# Patient Record
Sex: FEMALE
[De-identification: names, ages, dates, MRNs, and addresses within clinical notes are randomized per-mention and may not be internally consistent; named-entity substitution may affect disease eponyms.]

---

## 2022-08-03 ENCOUNTER — APPOINTMENT (OUTPATIENT)
Dept: INTERNAL MEDICINE | Facility: CLINIC | Age: 52
End: 2022-08-03

## 2022-08-03 VITALS
SYSTOLIC BLOOD PRESSURE: 110 MMHG | HEIGHT: 62.25 IN | BODY MASS INDEX: 32.34 KG/M2 | HEART RATE: 75 BPM | OXYGEN SATURATION: 98 % | DIASTOLIC BLOOD PRESSURE: 70 MMHG | WEIGHT: 178 LBS

## 2022-08-03 DIAGNOSIS — Z82.49 FAMILY HISTORY OF ISCHEMIC HEART DISEASE AND OTHER DISEASES OF THE CIRCULATORY SYSTEM: ICD-10-CM

## 2022-08-03 DIAGNOSIS — Z83.3 FAMILY HISTORY OF DIABETES MELLITUS: ICD-10-CM

## 2022-08-03 DIAGNOSIS — J06.9 ACUTE UPPER RESPIRATORY INFECTION, UNSPECIFIED: ICD-10-CM

## 2022-08-03 DIAGNOSIS — Z78.9 OTHER SPECIFIED HEALTH STATUS: ICD-10-CM

## 2022-08-03 DIAGNOSIS — Z83.438 FAMILY HISTORY OF OTHER DISORDER OF LIPOPROTEIN METABOLISM AND OTHER LIPIDEMIA: ICD-10-CM

## 2022-08-03 PROCEDURE — 99386 PREV VISIT NEW AGE 40-64: CPT

## 2022-08-03 PROCEDURE — G0444 DEPRESSION SCREEN ANNUAL: CPT | Mod: 59

## 2022-08-04 ENCOUNTER — NON-APPOINTMENT (OUTPATIENT)
Age: 52
End: 2022-08-04

## 2022-08-04 ENCOUNTER — TRANSCRIPTION ENCOUNTER (OUTPATIENT)
Age: 52
End: 2022-08-04

## 2022-08-05 PROBLEM — J06.9 VIRAL URI WITH COUGH: Status: RESOLVED | Noted: 2022-08-05 | Resolved: 2022-09-04

## 2022-08-05 PROBLEM — Z83.438 FAMILY HISTORY OF HYPERLIPIDEMIA: Status: ACTIVE | Noted: 2022-08-05

## 2022-08-05 PROBLEM — Z82.49 FAMILY HISTORY OF HYPERTENSION: Status: ACTIVE | Noted: 2022-08-05

## 2022-08-05 PROBLEM — Z78.9 NEVER SMOKED TOBACCO: Status: ACTIVE | Noted: 2022-08-05

## 2022-08-05 PROBLEM — Z83.3 FHX: DIABETES MELLITUS: Status: ACTIVE | Noted: 2022-08-05

## 2022-08-05 PROBLEM — Z83.3 FAMILY HISTORY OF DIABETES MELLITUS: Status: ACTIVE | Noted: 2022-08-05

## 2022-08-05 PROBLEM — Z82.49 FAMILY HISTORY OF ACUTE MYOCARDIAL INFARCTION: Status: ACTIVE | Noted: 2022-08-05

## 2022-08-05 LAB
25(OH)D3 SERPL-MCNC: 41.8 NG/ML
ALBUMIN SERPL ELPH-MCNC: 4.5 G/DL
ALP BLD-CCNC: 87 U/L
ALT SERPL-CCNC: 25 U/L
ANION GAP SERPL CALC-SCNC: 9 MMOL/L
APPEARANCE: CLEAR
AST SERPL-CCNC: 14 U/L
BACTERIA: NEGATIVE
BASOPHILS # BLD AUTO: 0.04 K/UL
BASOPHILS NFR BLD AUTO: 0.5 %
BILIRUB SERPL-MCNC: 0.3 MG/DL
BILIRUBIN URINE: NEGATIVE
BLOOD URINE: NEGATIVE
BUN SERPL-MCNC: 19 MG/DL
CALCIUM SERPL-MCNC: 10.3 MG/DL
CHLORIDE SERPL-SCNC: 105 MMOL/L
CHOLEST SERPL-MCNC: 227 MG/DL
CO2 SERPL-SCNC: 28 MMOL/L
COLOR: YELLOW
CREAT SERPL-MCNC: 0.88 MG/DL
EGFR: 79 ML/MIN/1.73M2
EOSINOPHIL # BLD AUTO: 0.21 K/UL
EOSINOPHIL NFR BLD AUTO: 2.9 %
ESTIMATED AVERAGE GLUCOSE: 111 MG/DL
GLUCOSE QUALITATIVE U: NEGATIVE
GLUCOSE SERPL-MCNC: 96 MG/DL
HBA1C MFR BLD HPLC: 5.5 %
HCT VFR BLD CALC: 38.8 %
HDLC SERPL-MCNC: 67 MG/DL
HGB BLD-MCNC: 12.4 G/DL
HYALINE CASTS: 2 /LPF
IMM GRANULOCYTES NFR BLD AUTO: 0.3 %
INSULIN SERPL-MCNC: 7.3 UU/ML
KETONES URINE: NEGATIVE
LDLC SERPL CALC-MCNC: 147 MG/DL
LEUKOCYTE ESTERASE URINE: NEGATIVE
LYMPHOCYTES # BLD AUTO: 2.28 K/UL
LYMPHOCYTES NFR BLD AUTO: 31.2 %
MAN DIFF?: NORMAL
MCHC RBC-ENTMCNC: 28.4 PG
MCHC RBC-ENTMCNC: 32 GM/DL
MCV RBC AUTO: 88.8 FL
MICROSCOPIC-UA: NORMAL
MONOCYTES # BLD AUTO: 0.4 K/UL
MONOCYTES NFR BLD AUTO: 5.5 %
NEUTROPHILS # BLD AUTO: 4.35 K/UL
NEUTROPHILS NFR BLD AUTO: 59.6 %
NITRITE URINE: NEGATIVE
NONHDLC SERPL-MCNC: 160 MG/DL
PH URINE: 6.5
PLATELET # BLD AUTO: 274 K/UL
POTASSIUM SERPL-SCNC: 4.3 MMOL/L
PROT SERPL-MCNC: 7.2 G/DL
PROTEIN URINE: NORMAL
RBC # BLD: 4.37 M/UL
RBC # FLD: 13 %
RED BLOOD CELLS URINE: 1 /HPF
SODIUM SERPL-SCNC: 141 MMOL/L
SPECIFIC GRAVITY URINE: 1.02
SQUAMOUS EPITHELIAL CELLS: 6 /HPF
T4 FREE SERPL-MCNC: 1.1 NG/DL
TRIGL SERPL-MCNC: 68 MG/DL
TSH SERPL-ACNC: 2.77 UIU/ML
UROBILINOGEN URINE: NORMAL
VIT B12 SERPL-MCNC: 797 PG/ML
WBC # FLD AUTO: 7.3 K/UL
WHITE BLOOD CELLS URINE: 3 /HPF

## 2022-08-05 RX ORDER — ELECTROLYTES/DEXTROSE
SOLUTION, ORAL ORAL
Refills: 0 | Status: ACTIVE | COMMUNITY
Start: 2022-08-05

## 2022-08-31 ENCOUNTER — APPOINTMENT (OUTPATIENT)
Dept: INTERNAL MEDICINE | Facility: CLINIC | Age: 52
End: 2022-08-31

## 2022-09-15 ENCOUNTER — TRANSCRIPTION ENCOUNTER (OUTPATIENT)
Age: 52
End: 2022-09-15

## 2022-09-15 NOTE — HISTORY OF PRESENT ILLNESS
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 , still sees patients but less and more sedentary\par 3 children-3 c sections, 13, 16, 19 girls\par  is ID specialist with [employer]\par Moved to [place] at age 1, Moved to [place], [place], Father is ortho surgeon,  [facility] [employer] [facility]\par Concerns:\par Weight -dietng -lost 12 lbs in past year , but weight not budging now, wants medications to assist weight loss\par Weight training\par Lives in [place]\par Had 3 COVID vaccines, last 10/13/21\par Knees hurting at times with prolonged standing/walking\par Hx chronic cough after URI , possible postnasal drip, given amityrtiline and reduced cough\par Hx hair loss and saw derm who gave  her meds that helped\par Irregular menses age 47, LMP 2021

## 2022-09-15 NOTE — REVIEW OF SYSTEMS
[Recent Change In Weight] : ~T recent weight change [Hair Changes] : hair changes [Anxiety] : anxiety [Depression] : depression [Negative] : Heme/Lymph [FreeTextEntry8] : irregular meses, hot flashes, LMP Nov 2021 [de-identified] : hair loss

## 2022-09-15 NOTE — HEALTH RISK ASSESSMENT
[Never] : Never [No] : No [No falls in past year] : Patient reported no falls in the past year [0] : 2) Feeling down, depressed, or hopeless: Not at all (0) [PHQ-2 Negative - No further assessment needed] : PHQ-2 Negative - No further assessment needed [Patient reported mammogram was normal] : Patient reported mammogram was normal [Patient reported PAP Smear was normal] : Patient reported PAP Smear was normal [Patient declined colonoscopy] : Patient declined colonoscopy [Never (0 pts)] : Never (0 points) [Audit-CScore] : 0 [[id number]] : 0 [MammogramDate] : 02/22 [PapSmearDate] : 01/22 [ColonoscopyComments] : declined-will do FOBT

## 2022-09-23 ENCOUNTER — APPOINTMENT (OUTPATIENT)
Dept: BARIATRICS/WEIGHT MGMT | Facility: CLINIC | Age: 52
End: 2022-09-23

## 2022-09-23 VITALS — BODY MASS INDEX: 32.09 KG/M2 | HEIGHT: 62 IN | WEIGHT: 174.4 LBS

## 2022-09-23 PROCEDURE — 99205 OFFICE O/P NEW HI 60 MIN: CPT | Mod: 95

## 2022-09-23 NOTE — ASSESSMENT
[FreeTextEntry1] : 52 y.o F with Class 1 obesity, insulin resistance presents for weight management\par \par - open to starting metformin - 1 tab qAM x 1 week, then incr to bid\par - can try Ozempic PA after metformin trial\par - could also consider phentermine, or topiramate\par - discussed shifting ratios of carbs/protein/carbs\par - incr water intake to 64 oz per day\par - can talk to RDN\par - can send email handouts\par \par f/u 4-6 weeks\par

## 2022-09-23 NOTE — HISTORY OF PRESENT ILLNESS
[FreeTextEntry1] : Bariatric surgery history: none\par Obesity co-morbidities:HLD\par Comorbidities improved or resolved:none\par Anti-obesity medications: none\par Obesity medication side effects: none\par \par PATIENT WAS NOTIFIED THAT ANYTHING WE DISCUSSED IN OUR MEETING TODAY MAY BE REFLECTED IN WRITING IN THE VISIT NOTE WHICH WILL BE AVAILABLE TO OTHER MEDICAL PROVIDERS TO REVIEW AS PART OF ROUTINE PATIENT CARE.  PATIENT VERBALLY AGREED. \par \par Ms. GRAEME GRIMM is a 52 year year old female who presents for evaluation and treatment of Class 1 obesity. \par \par Obesity related co- morbidities:  HLD\par + perimenopause\par bad cough reflex\par 174.4, 5'2"\par \par amitryptylline - bid for bad cough reflex, with little codeine.  \par \par Patient lives - 3 kids, . \par Employment status - dentist - 4 days a week\par \par Weight History:\par Lowest adult weight: 120 - before marriage - in 20s.  \par Highest adult weight: 186\par \par Has lost 10 pounds over the past year.\par \par Obesity began in teens.  Weight gain has occurred with: 3 pregnancies. Lots of yoyo dieting.  3rd child at age 38 - post preg 170s, went to the gym, got , stayed in the 170s. \par \par Past weight loss attempts include: macrosInc, liquid diet, RDN.  These have produced a maximum of 10 pounds of weight loss.  MacrosInc for 1.5 years, tracking/weighing of macros. check in with  every week.\par Anti-obesity medications in the past: none.  1221 calories per day - 104g protein, 109g carbs, 41g fat. \par \par Reasons for desiring weight loss: health\par Perceived obstacles to losing weight: unsure. \par \par Sleep: 7 hours.  +snoring. previously was getting less sleep. some menopausal symptoms.  Had BERKLEY screen 7 years - mild BERKLEY, no intervention. \par \par Has 3 regular meals a day. \par \par Diet history:\par wakes up at: 6am\par B: 7am - cottage cheese/greek yogurt, zone protein bar, and cup of coffee\par L: brings lunch at work - spaghetti squash, chicken, marinara sauce and cheese\par snack - 2nd cup of coffee, or protein shakes\par D: eats together - 7- 8pm - sometimes basmati - broiled rice cooked - 1 cup or under salad/veg, with craven lentils or chicken.  \par nighttime - every once in a while, chips - 2 chips, piece of cake\par No pork.\par \par snacks: throughout day - potato chips\par eating after dinner: sometimes\par overeating episodes: rare\par \par Sodas/fast food/processed foods: soda sometimes - 1/week.  \par \par Water intake per day: 4+ cups per day. up and down. usually 20 oz x 2.  \par coffee 1-2 cups per day.  puts milk, and tablespoon creamer. \par soda 1/week - diet pepsi. \par No alcohol. \par \par Physical activity:\par Patient enjoys: biking, walking\par Current physical activity: strength training 3/week, outdoor walk 1/week, peloton 3x/week x 30+ minutes\par \par  Habits patient would like to change: unsure\par Level of interest in losing weight:5/5\par Community support: 5/5\par \par Factors that have helped in the past with losing weight and keeping it off: none\par \par

## 2022-09-23 NOTE — REASON FOR VISIT
[Home] : at home, [unfilled] , at the time of the visit. [Medical Office: ([facility])___] : at the medical office located in  [Initial Evaluation] : an initial evaluation

## 2022-09-23 NOTE — REVIEW OF SYSTEMS
[Patient Intake Form Reviewed] : Patient intake form was reviewed [Negative] : Allergic/Immunologic [MED-ROS-Neuro-FT] : headaches

## 2022-10-18 ENCOUNTER — RX CHANGE (OUTPATIENT)
Age: 52
End: 2022-10-18

## 2022-10-18 RX ORDER — METFORMIN HYDROCHLORIDE 500 MG/1
500 TABLET, COATED ORAL TWICE DAILY
Qty: 60 | Refills: 1 | Status: DISCONTINUED | COMMUNITY
Start: 2022-09-23 | End: 2022-10-18

## 2022-10-28 ENCOUNTER — APPOINTMENT (OUTPATIENT)
Dept: BARIATRICS/WEIGHT MGMT | Facility: CLINIC | Age: 52
End: 2022-10-28

## 2022-10-28 VITALS — WEIGHT: 175.5 LBS | BODY MASS INDEX: 32.1 KG/M2

## 2022-10-28 PROCEDURE — 99214 OFFICE O/P EST MOD 30 MIN: CPT | Mod: 95

## 2022-10-28 NOTE — REASON FOR VISIT
[Initial Evaluation] : an initial evaluation [Home] : at home, [unfilled] , at the time of the visit. [Medical Office: ([facility])___] : at the medical office located in

## 2022-10-28 NOTE — HISTORY OF PRESENT ILLNESS
[FreeTextEntry1] : Bariatric surgery history: none\par Obesity co-morbidities:HLD\par Comorbidities improved or resolved:none\par Anti-obesity medications: metformin\par Obesity medication side effects: none\par \par PATIENT WAS NOTIFIED THAT ANYTHING WE DISCUSSED IN OUR MEETING TODAY MAY BE REFLECTED IN WRITING IN THE VISIT NOTE WHICH WILL BE AVAILABLE TO OTHER MEDICAL PROVIDERS TO REVIEW AS PART OF ROUTINE PATIENT CARE.  PATIENT VERBALLY AGREED. \par \par Ms. GRAEME GRIMM is a 52 year year old female who presents for evaluation and treatment of Class 1 obesity. \par \par Obesity related co- morbidities:  HLD\par + perimenopause\par bad cough reflex\par 174.4, 5'2"\par \par amitryptylline - bid for bad cough reflex, with little codeine.  \par \par Patient lives - 3 kids, . \par Employment status - dentist - 4 days a week\par \par Weight History:\par Lowest adult weight: 120 - before marriage - in 20s.  \par Highest adult weight: 186\par \par Has lost 10 pounds over the past year.\par \par Interim:\par - started metformin, twice a day. \par - did help with appetite.  \par - cough is better.  went to go back to gym. \par - on amitriptylline for cough as needed\par - working with a  online for weight training - 3 days a week\par \par Obesity began in teens.  Weight gain has occurred with: 3 pregnancies. Lots of yoyo dieting.  3rd child at age 38 - post preg 170s, went to the gym, got , stayed in the 170s. \par \par Past weight loss attempts include: macrosInc, liquid diet, RDN.  These have produced a maximum of 10 pounds of weight loss.  MacrosInc for 1.5 years, tracking/weighing of macros. check in with  every week.\par Anti-obesity medications in the past: none.  1221 calories per day - 104g protein, 109g carbs, 41g fat. \par \par Reasons for desiring weight loss: health\par Perceived obstacles to losing weight: unsure. \par \par Sleep: 7 hours.  +snoring. previously was getting less sleep. some menopausal symptoms.  Had BERKLEY screen 7 years - mild BERKLEY, no intervention. \par \par Has 3 regular meals a day. \par \par Diet history:\par wakes up at: 6am\par B: 7am - cottage cheese/greek yogurt, zone protein bar -less these days, and cup of coffee\par L: 2-3pm brings lunch at work - spaghetti squash, chicken, marinara sauce and cheese\par snack - 2nd cup of coffee, or protein shakes\par D: eats together - 7- 8pm - sometimes basmati - broiled rice cooked - 1 cup or under salad/veg, with craven lentils or chicken.  \par nighttime - every once in a while, chips - 2 chips, piece of cake, less these days.\par No pork.\par \par snacks: throughout day - potato chips\par eating after dinner: sometimes\par overeating episodes: rare\par \par Sodas/fast food/processed foods: soda sometimes - 1/week.  \par \par Water intake per day: 4+ cups per day. up and down. usually 20 oz x 2.  \par coffee 1-2 cups per day.  puts milk, and tablespoon creamer. \par soda 1/week - diet pepsi. \par No alcohol. \par \par Physical activity:\par Patient enjoys: biking, walking\par Current physical activity: strength training 3/week, outdoor walk 1/week, peloton 3x/week x 30+ minutes\par \par  Habits patient would like to change: unsure\par Level of interest in losing weight:5/5\par Community support: 5/5\par \par Factors that have helped in the past with losing weight and keeping it off: none\par \par

## 2022-10-28 NOTE — ASSESSMENT
[FreeTextEntry1] : 52 y.o F with Class 1 obesity, insulin resistance presents for weight management\par \par - continue metformin bid.\par - resend Ozempic, metformin failed\par - could also consider topiramate.  In the past was on a stimulant - did not like the stimulant effects\par - discussed shifting ratios of carbs/protein\par - incr water intake to 64 oz per day\par - can talk to RDN\par - can send email handouts\par \par f/u 4-6 weeks\par

## 2022-12-05 ENCOUNTER — TRANSCRIPTION ENCOUNTER (OUTPATIENT)
Age: 52
End: 2022-12-05

## 2022-12-12 ENCOUNTER — TRANSCRIPTION ENCOUNTER (OUTPATIENT)
Age: 52
End: 2022-12-12

## 2022-12-12 ENCOUNTER — APPOINTMENT (OUTPATIENT)
Dept: GASTROENTEROLOGY | Facility: CLINIC | Age: 52
End: 2022-12-12

## 2022-12-12 DIAGNOSIS — Z98.891 HISTORY OF UTERINE SCAR FROM PREVIOUS SURGERY: ICD-10-CM

## 2022-12-12 DIAGNOSIS — Z87.898 PERSONAL HISTORY OF OTHER SPECIFIED CONDITIONS: ICD-10-CM

## 2022-12-12 DIAGNOSIS — K66.0 PERITONEAL ADHESIONS (POSTPROCEDURAL) (POSTINFECTION): ICD-10-CM

## 2022-12-12 DIAGNOSIS — N95.1 MENOPAUSAL AND FEMALE CLIMACTERIC STATES: ICD-10-CM

## 2022-12-12 PROCEDURE — 99443: CPT

## 2022-12-12 RX ORDER — SODIUM SULFATE, MAGNESIUM SULFATE, AND POTASSIUM CHLORIDE 17.75; 2.7; 2.25 G/1; G/1; G/1
1479-225-188 TABLET ORAL
Qty: 1 | Refills: 0 | Status: ACTIVE | COMMUNITY
Start: 2022-12-12 | End: 1900-01-01

## 2022-12-14 ENCOUNTER — APPOINTMENT (OUTPATIENT)
Dept: BARIATRICS/WEIGHT MGMT | Facility: CLINIC | Age: 52
End: 2022-12-14

## 2023-01-02 ENCOUNTER — NON-APPOINTMENT (OUTPATIENT)
Age: 53
End: 2023-01-02

## 2023-01-03 ENCOUNTER — TRANSCRIPTION ENCOUNTER (OUTPATIENT)
Age: 53
End: 2023-01-03

## 2023-01-03 RX ORDER — POLYETHYLENE GLYCOL 3350 17 G/17G
17 POWDER, FOR SOLUTION ORAL
Qty: 1 | Refills: 0 | Status: ACTIVE | COMMUNITY
Start: 2023-01-03 | End: 1900-01-01

## 2023-01-03 RX ORDER — SODIUM SULFATE, MAGNESIUM SULFATE, AND POTASSIUM CHLORIDE 17.75; 2.7; 2.25 G/1; G/1; G/1
1479-225-188 TABLET ORAL
Qty: 1 | Refills: 0 | Status: ACTIVE | COMMUNITY
Start: 2023-01-03 | End: 1900-01-01

## 2023-01-04 ENCOUNTER — TRANSCRIPTION ENCOUNTER (OUTPATIENT)
Age: 53
End: 2023-01-04

## 2023-01-04 LAB — SARS-COV-2 N GENE NPH QL NAA+PROBE: NOT DETECTED

## 2023-01-05 ENCOUNTER — OUTPATIENT (OUTPATIENT)
Dept: OUTPATIENT SERVICES | Facility: HOSPITAL | Age: 53
LOS: 1 days | End: 2023-01-05
Payer: COMMERCIAL

## 2023-01-05 ENCOUNTER — APPOINTMENT (OUTPATIENT)
Dept: GASTROENTEROLOGY | Facility: HOSPITAL | Age: 53
End: 2023-01-05

## 2023-01-05 PROCEDURE — 36415 COLL VENOUS BLD VENIPUNCTURE: CPT

## 2023-01-05 PROCEDURE — 81025 URINE PREGNANCY TEST: CPT

## 2023-01-05 PROCEDURE — 84702 CHORIONIC GONADOTROPIN TEST: CPT

## 2023-01-09 ENCOUNTER — TRANSCRIPTION ENCOUNTER (OUTPATIENT)
Age: 53
End: 2023-01-09

## 2023-01-09 DIAGNOSIS — K21.9 GASTRO-ESOPHAGEAL REFLUX DISEASE WITHOUT ESOPHAGITIS: ICD-10-CM

## 2023-01-09 DIAGNOSIS — Z12.11 ENCOUNTER FOR SCREENING FOR MALIGNANT NEOPLASM OF COLON: ICD-10-CM

## 2023-01-09 DIAGNOSIS — Z00.00 ENCOUNTER FOR GENERAL ADULT MEDICAL EXAMINATION WITHOUT ABNORMAL FINDINGS: ICD-10-CM

## 2023-01-11 ENCOUNTER — TRANSCRIPTION ENCOUNTER (OUTPATIENT)
Age: 53
End: 2023-01-11

## 2023-01-17 ENCOUNTER — APPOINTMENT (OUTPATIENT)
Dept: BARIATRICS/WEIGHT MGMT | Facility: CLINIC | Age: 53
End: 2023-01-17
Payer: COMMERCIAL

## 2023-01-17 VITALS — WEIGHT: 169 LBS | BODY MASS INDEX: 30.91 KG/M2

## 2023-01-17 PROCEDURE — 99214 OFFICE O/P EST MOD 30 MIN: CPT | Mod: 95

## 2023-01-17 NOTE — REASON FOR VISIT
[Home] : at home, [unfilled] , at the time of the visit. [Medical Office: ([facility])___] : at the medical office located in  [Follow-Up] : a follow-up visit

## 2023-01-17 NOTE — ASSESSMENT
[FreeTextEntry1] : 52 y.o F with Class 1 obesity, insulin resistance presents for weight management\par \par - continue metformin bid.\par - continue Ozempic 0.5mg - just got over nausea and wants to stay at this  dose for now\par - discussed having lighter dinner, adding breakfast, and heavier lunch\par - we can talk about Ramadan fasting suggestions to keep in mind in March\par - could also consider topiramate.  In the past was on a stimulant - did not like the stimulant effects\par - discussed shifting ratios of carbs/protein\par - incr water intake to 64 oz per day\par - can talk to RDN\par - can send email handouts\par \par f/u 4-6 weeks\par  none...

## 2023-01-17 NOTE — HISTORY OF PRESENT ILLNESS
[FreeTextEntry1] : Bariatric surgery history: none\par Obesity co-morbidities:HLD\par Comorbidities improved or resolved:none\par Anti-obesity medications: metformin, Ozempic\par Obesity medication side effects: none\par \par PATIENT WAS NOTIFIED THAT ANYTHING WE DISCUSSED IN OUR MEETING TODAY MAY BE REFLECTED IN WRITING IN THE VISIT NOTE WHICH WILL BE AVAILABLE TO OTHER MEDICAL PROVIDERS TO REVIEW AS PART OF ROUTINE PATIENT CARE.  PATIENT VERBALLY AGREED. \par \par Ms. GRAEME GRIMM is a 52 year year old female who presents for evaluation and treatment of Class 1 obesity. \par \par Obesity related co- morbidities:  HLD\par + perimenopause\par bad cough reflex\par 174.4, 5'2"\par \par amitryptylline - bid for bad cough reflex, with little codeine.  \par \par Patient lives - 3 kids, . \par Employment status - dentist - 4 days a week\par \par Weight History:\par Lowest adult weight: 120 - before marriage - in 20s.  \par Highest adult weight: 186\par \par Has lost 10 pounds over the past year.\par \par Interim:\par - taking Ozempic for 2 months - now 0.5mg.  Has noticed decr appetite, nausea is managed\par - she went through colonoscopy prep but didn't get colonoscopy bc of a mixup w false pos preg test, she lost 5 lbs and has regained about 3#\par - taking metformin at night.  \par - did help with appetite.  \par - working with a  online for weight training - 3 days a week\par - doing IF 12-8pm, with friends.  having 2 meals a day, dinner heaviest with family\par - continues with the Peloton\par \par Obesity began in teens.  Weight gain has occurred with: 3 pregnancies. Lots of yoyo dieting.  3rd child at age 38 - post preg 170s, went to the gym, got , stayed in the 170s. \par \par Past weight loss attempts include: macrosInc, liquid diet, RDN.  These have produced a maximum of 10 pounds of weight loss.  MacrosInc for 1.5 years, tracking/weighing of macros. check in with  every week.\par Anti-obesity medications in the past: none.  1221 calories per day - 104g protein, 109g carbs, 41g fat. \par \par Reasons for desiring weight loss: health\par Perceived obstacles to losing weight: unsure. \par \par Sleep: 7 hours.  +snoring. previously was getting less sleep. some menopausal symptoms.  Had BERKLEY screen 7 years - mild BERKLEY, no intervention. \par \par Has 3 regular meals a day. \par \par Diet history:\par wakes up at: 6am\par B: 7am - cottage cheese/greek yogurt, zone protein bar -less these days, and cup of coffee\par L: 2-3pm brings lunch at work - spaghetti squash, chicken, marinara sauce and cheese.  These days 12-1pm\par snack - 2nd cup of coffee, or protein shakes\par D: eats together - 7- 8pm - sometimes basmati - broiled rice cooked - 1 cup or under salad/veg, with craven lentils or chicken.  \par nighttime - every once in a while, chips - 2 chips, piece of cake, less these days.\par No pork.\par \par snacks: throughout day - potato chips\par eating after dinner: sometimes\par overeating episodes: rare\par \par Sodas/fast food/processed foods: soda sometimes - 1/week.  \par \par Water intake per day: 64 oz per day\par coffee 1-2 cups per day.  puts milk, and tablespoon creamer. \par soda 1/week - diet pepsi. \par No alcohol. \par \par Physical activity:\par Patient enjoys: biking, walking\par Current physical activity: strength training 3/week, outdoor walk 1/week, peloton 3x/week x 30+ minutes\par \par  Habits patient would like to change: unsure\par Level of interest in losing weight:5/5\par Community support: 5/5\par \par Factors that have helped in the past with losing weight and keeping it off: none\par \par

## 2023-02-28 ENCOUNTER — APPOINTMENT (OUTPATIENT)
Dept: BARIATRICS/WEIGHT MGMT | Facility: CLINIC | Age: 53
End: 2023-02-28
Payer: COMMERCIAL

## 2023-02-28 VITALS — BODY MASS INDEX: 30.89 KG/M2 | WEIGHT: 168.9 LBS

## 2023-02-28 DIAGNOSIS — Z12.11 ENCOUNTER FOR SCREENING FOR MALIGNANT NEOPLASM OF COLON: ICD-10-CM

## 2023-02-28 PROCEDURE — 99214 OFFICE O/P EST MOD 30 MIN: CPT | Mod: 95

## 2023-02-28 NOTE — REASON FOR VISIT
[Follow-Up] : a follow-up visit [Home] : at home, [unfilled] , at the time of the visit. [Medical Office: ([facility])___] : at the medical office located in  English

## 2023-02-28 NOTE — ASSESSMENT
[FreeTextEntry1] : 52 y.o F with Class 1 obesity, insulin resistance presents for weight management\par \par - continue metformin bid.\par - we discussed Ramadan changes - decr to 0.25mg during those weeks, d/c if having nausea.  \par - continue Ozempic 0.5mg.  Next time can consider increasing to Ozempic 1mg and self-titrate to 0.75\par - discussed having lighter dinner, adding breakfast, and heavier lunch\par - could also consider topiramate.  In the past was on a stimulant - did not like the stimulant effects\par - discussed shifting ratios of carbs/protein\par - incr water intake to 64 oz per day\par - can talk to RDN - given #  - open to more plant based meals\par \par f/u 4-6 weeks\par

## 2023-02-28 NOTE — HISTORY OF PRESENT ILLNESS
[FreeTextEntry1] : Bariatric surgery history: none\par Obesity co- morbidities:HLD\par Comorbidities improved or resolved:none\par Anti-obesity medications: metformin, Ozempic\par Obesity medication side effects: none\par \par PATIENT WAS NOTIFIED THAT ANYTHING WE DISCUSSED IN OUR MEETING TODAY MAY BE REFLECTED IN WRITING IN THE VISIT NOTE WHICH WILL BE AVAILABLE TO OTHER MEDICAL PROVIDERS TO REVIEW AS PART OF ROUTINE PATIENT CARE.  PATIENT VERBALLY AGREED. \par \par Ms. GRAEME GRIMM is a 52 year year old female who presents for evaluation and treatment of Class 1 obesity. \par \par Obesity related co- morbidities:  HLD\par + perimenopause\par bad cough reflex\par 174.4, 5'2"\par \par amitryptylline - bid for bad cough reflex, with little codeine.  \par \par Patient lives - 3 kids, . \par Employment status - dentist - 4 days a week\par \par Weight History:\par Lowest adult weight: 120 - before marriage - in 20s.  \par Highest adult weight: 186\par \par Has lost 10 pounds over the past year.\par \par Interim:\par - taking Ozempic for 2 months - now 0.5mg.  Has noticed decr appetite, nausea is managed - not as obvious as what it was when she started taking it though.  She's open to increasing but better to wait until after Ramadan is done\par - taking nighttime metformin, skipping in the mornings.\par - working with a  online for weight training - 3 days a week, but this month visited family and has been pretty hectic, weight is about the same\par - doing IF 12-8pm, with friends.  having 2 meals a day, dinner heaviest with family\par - continues with the Peloton, restart this week when back home\par \par Obesity began in teens.  Weight gain has occurred with: 3 pregnancies. Lots of yoyo dieting.  3rd child at age 38 - post preg 170s, went to the gym, got , stayed in the 170s. \par \par Past weight loss attempts include: macrosInc, liquid diet, RDN.  These have produced a maximum of 10 pounds of weight loss.  MacrosInc for 1.5 years, tracking/weighing of macros. check in with  every week.\par Anti-obesity medications in the past: none.  1221 calories per day - 104g protein, 109g carbs, 41g fat. \par \par Reasons for desiring weight loss: health\par Perceived obstacles to losing weight: unsure. \par \par Sleep: 7 hours.  +snoring. previously was getting less sleep. some menopausal symptoms.  Had BERKLEY screen 7 years - mild BERKLEY, no intervention. \par \par Has 3 regular meals a day. \par \par Diet history:\par wakes up at: 6am\par B: 7am - cottage cheese/greek yogurt, zone protein bar -less these days, and cup of coffee\par L: 2-3pm brings lunch at work - spaghetti squash, chicken, marinara sauce and cheese.  These days 12-1pm\par snack - 2nd cup of coffee, or protein shakes\par D: eats together - 7- 8pm - sometimes basmati - broiled rice cooked - 1 cup or under salad/veg, with craven lentils or chicken.  \par nighttime - every once in a while, chips - 2 chips, piece of cake, less these days.\par No pork.\par \par snacks: throughout day - potato chips\par eating after dinner: sometimes\par overeating episodes: rare\par \par Sodas/fast food/processed foods: soda sometimes - 1/week.  \par \par Water intake per day: 64 oz per day\par coffee 1-2 cups per day.  puts milk, and tablespoon creamer. \par soda 1/week - diet pepsi. \par No alcohol. \par \par Physical activity:\par Patient enjoys: biking, walking\par Current physical activity: strength training 3/week, outdoor walk 1/week, peloton 3x/week x 30+ minutes\par \par  Habits patient would like to change: unsure\par Level of interest in losing weight:5/5\par Community support: 5/5\par \par Factors that have helped in the past with losing weight and keeping it off: none\par \par

## 2023-03-08 ENCOUNTER — LABORATORY RESULT (OUTPATIENT)
Age: 53
End: 2023-03-08

## 2023-03-10 ENCOUNTER — APPOINTMENT (OUTPATIENT)
Dept: GASTROENTEROLOGY | Facility: AMBULATORY MEDICAL SERVICES | Age: 53
End: 2023-03-10
Payer: COMMERCIAL

## 2023-03-10 ENCOUNTER — RESULT REVIEW (OUTPATIENT)
Age: 53
End: 2023-03-10

## 2023-03-10 PROCEDURE — 43239 EGD BIOPSY SINGLE/MULTIPLE: CPT | Mod: 59

## 2023-03-10 PROCEDURE — 45378 DIAGNOSTIC COLONOSCOPY: CPT

## 2023-04-04 ENCOUNTER — APPOINTMENT (OUTPATIENT)
Dept: BARIATRICS/WEIGHT MGMT | Facility: CLINIC | Age: 53
End: 2023-04-04
Payer: COMMERCIAL

## 2023-04-04 VITALS — WEIGHT: 168 LBS | BODY MASS INDEX: 30.73 KG/M2

## 2023-04-04 DIAGNOSIS — K21.9 GASTRO-ESOPHAGEAL REFLUX DISEASE W/OUT ESOPHAGITIS: ICD-10-CM

## 2023-04-04 PROCEDURE — 99214 OFFICE O/P EST MOD 30 MIN: CPT | Mod: 95

## 2023-04-04 NOTE — REASON FOR VISIT
[Follow-Up] : a follow-up visit [Home] : at home, [unfilled] , at the time of the visit. [Medical Office: ([facility])___] : at the medical office located in

## 2023-04-04 NOTE — HISTORY OF PRESENT ILLNESS
[FreeTextEntry1] : Bariatric surgery history: none\par Obesity co- morbidities:HLD\par Comorbidities improved or resolved:none\par Anti-obesity medications: metformin, Ozempic\par Obesity medication side effects: none\par \par PATIENT WAS NOTIFIED THAT ANYTHING WE DISCUSSED IN OUR MEETING TODAY MAY BE REFLECTED IN WRITING IN THE VISIT NOTE WHICH WILL BE AVAILABLE TO OTHER MEDICAL PROVIDERS TO REVIEW AS PART OF ROUTINE PATIENT CARE.  PATIENT VERBALLY AGREED. \par \par Ms. GRAEME GRIMM is a 52 year year old female who presents for evaluation and treatment of Class 1 obesity. \par \par Obesity related co- morbidities:  HLD\par + perimenopause\par bad cough reflex\par 174.4, 5'2"\par \par amitryptylline - bid for bad cough reflex, with little codeine.  \par \par Patient lives - 3 kids, . \par Employment status - dentist - 4 days a week\par \par Weight History:\par Lowest adult weight: 120 - before marriage - in 20s.  \par Highest adult weight: 186\par \par Has lost 10 pounds over the past year.\par \par Interim:\par - taking Ozempic for 2 months - now 0.5mg.  Has noticed decr appetite, nausea is managed - not as obvious as what it was when she started taking it though.  \par - taking metformin once a day, and Ozempic 0.25mg \par - She's open to increasing but better to wait until after Ramadan is done - Apr 20th\par - yogurt in the morning, and fruit, 5AM, and evenings 7pm - 1-2 times fried stuff, chaat, samosa, and wait 1 hour for meal\par - taking nighttime metformin, skipping in the mornings\par - working with a  online for weight training - 3 days a week, but this month visited family and has been pretty hectic, weight is about the same\par - doing IF 12-8pm, with friends.  having 2 meals a day, dinner heaviest with family\par - continues with the Peloton, restart this week when back home\par \par Obesity began in teens.  Weight gain has occurred with: 3 pregnancies. Lots of yoyo dieting.  3rd child at age 38 - post preg 170s, went to the gym, got , stayed in the 170s. \par \par Past weight loss attempts include: macrosInc, liquid diet, RDN.  These have produced a maximum of 10 pounds of weight loss.  MacrosInc for 1.5 years, tracking/weighing of macros. check in with  every week.\par Anti-obesity medications in the past: none.  1221 calories per day - 104g protein, 109g carbs, 41g fat. \par \par Reasons for desiring weight loss: health\par Perceived obstacles to losing weight: unsure. \par \par Sleep: 7 hours.  +snoring. previously was getting less sleep. some menopausal symptoms.  Had BERKLEY screen 7 years - mild BERKLEY, no intervention. \par \par Has 3 regular meals a day. \par \par Diet history:\par wakes up at: 6am\par B: 7am - cottage cheese/greek yogurt, zone protein bar -less these days, and cup of coffee\par L: 2-3pm brings lunch at work - spaghetti squash, chicken, marinara sauce and cheese.  These days 12-1pm\par snack - 2nd cup of coffee, or protein shakes\par D: eats together - 7- 8pm - sometimes basmati - broiled rice cooked - 1 cup or under salad/veg, with craven lentils or chicken.  \par nighttime - every once in a while, chips - 2 chips, piece of cake, less these days.\par No pork.\par \par snacks: throughout day - potato chips\par eating after dinner: sometimes\par overeating episodes: rare\par \par Sodas/fast food/processed foods: soda sometimes - 1/week.  \par \par Water intake per day: 64 oz per day\par coffee 1-2 cups per day.  puts milk, and tablespoon creamer. \par soda 1/week - diet pepsi. \par No alcohol. \par \par Physical activity:\par Patient enjoys: biking, walking\par Current physical activity: strength training 3/week, outdoor walk 1/week, peloton 3x/week x 30+ minutes\par \par  Habits patient would like to change: unsure\par Level of interest in losing weight:5/5\par Community support: 5/5\par \par Factors that have helped in the past with losing weight and keeping it off: none\par \par

## 2023-04-04 NOTE — ASSESSMENT
[FreeTextEntry1] : 52 y.o F with Class 1 obesity, insulin resistance presents for weight management\par \par - continue metformin once a day. \par - we discussed Ramadan changes - decr to 0.25mg during those weeks, d/c if having nausea - doing well\par - continue Ozempic 0.25mg for 2 weeks until end of Ramadan.  Next time can consider increasing to Ozempic 0.5mg after Ramadan ends and go up on metformin again to bid\par - could also consider topiramate.  In the past was on a stimulant - did not like the stimulant effects\par - discussed shifting ratios of carbs/protein\par - incr water intake to 64 oz per day, as tolerated\par - can talk to RDN - given #  - open to more plant based meals\par \par f/u 4-6 weeks\par

## 2023-04-20 ENCOUNTER — TRANSCRIPTION ENCOUNTER (OUTPATIENT)
Age: 53
End: 2023-04-20

## 2023-05-10 ENCOUNTER — TRANSCRIPTION ENCOUNTER (OUTPATIENT)
Age: 53
End: 2023-05-10

## 2023-05-12 ENCOUNTER — APPOINTMENT (OUTPATIENT)
Dept: BARIATRICS/WEIGHT MGMT | Facility: CLINIC | Age: 53
End: 2023-05-12
Payer: COMMERCIAL

## 2023-05-12 VITALS — BODY MASS INDEX: 30.54 KG/M2 | WEIGHT: 167 LBS

## 2023-05-12 PROCEDURE — 99214 OFFICE O/P EST MOD 30 MIN: CPT | Mod: 95

## 2023-05-12 RX ORDER — SEMAGLUTIDE 0.68 MG/ML
2 INJECTION, SOLUTION SUBCUTANEOUS
Qty: 1 | Refills: 2 | Status: DISCONTINUED | COMMUNITY
Start: 2022-10-28 | End: 2023-05-12

## 2023-05-15 NOTE — HISTORY OF PRESENT ILLNESS
[FreeTextEntry1] : Bariatric surgery history: none\par Obesity co- morbidities:HLD\par Comorbidities improved or resolved:none\par Anti-obesity medications: metformin, Wegovy\par Obesity medication side effects: none\par \par PATIENT WAS NOTIFIED THAT ANYTHING WE DISCUSSED IN OUR MEETING TODAY MAY BE REFLECTED IN WRITING IN THE VISIT NOTE WHICH WILL BE AVAILABLE TO OTHER MEDICAL PROVIDERS TO REVIEW AS PART OF ROUTINE PATIENT CARE.  PATIENT VERBALLY AGREED. \par \par Ms. GRAEME GRIMM is a 52 year year old female who presents for evaluation and treatment of Class 1 obesity. \par \par Obesity related co- morbidities:  HLD\par + perimenopause\par bad cough reflex\par 174.4, 5'2"\par \par amitryptylline - bid for bad cough reflex, with little codeine.  \par \par Patient lives - 3 kids, . \par Employment status - dentist - 4 days a week\par \par Weight History:\par Lowest adult weight: 120 - before marriage - in 20s.  \par Highest adult weight: 186\par \par Has lost 10 pounds over the past year.\par \par Interim:\par - taking Ozempic for 2 months - now 0.5mg.  Has noticed decr appetite, nausea is managed - not as obvious as what it was when she started taking it though.  \par - taking metformin once a day, and Wegovy 0.5mg.   \par - yogurt in the morning, and fruit, 5AM, and evenings 7pm - 1-2 times fried stuff, chaat, samosa, and wait 1 hour for meal\par - taking nighttime metformin, skipping in the mornings\par - working with a  online for weight training - 3 days a week, but this month visited family and has been pretty hectic, weight is about the same\par - doing IF 12-8pm, with friends.  having 2 meals a day, dinner heaviest with family\par - continues with the [place], restart this week when back home\par \par Obesity began in teens.  Weight gain has occurred with: 3 pregnancies. Lots of yoyo dieting.  3rd child at age 38 - post preg 170s, went to the gym, got , stayed in the 170s. \par \par Past weight loss attempts include: macrosInc, liquid diet, RDN.  These have produced a maximum of 10 pounds of weight loss.  MacrosInc for 1.5 years, tracking/weighing of macros. check in with  every week.\par Anti-obesity medications in the past: none.  1221 calories per day - 104g protein, 109g carbs, 41g fat. \par \par Reasons for desiring weight loss: health\par Perceived obstacles to losing weight: unsure. \par \par Sleep: 7 hours.  +snoring. previously was getting less sleep. some menopausal symptoms.  Had BERKLEY screen 7 years - mild BERKLEY, no intervention. \par \par Has 3 regular meals a day. \par \par Diet history:\par wakes up at: 6am\par B: 7am - cottage cheese/greek yogurt, zone protein bar -less these days, and cup of coffee. sometimes skips\par L: 2-3pm brings lunch at work - spaghetti squash, chicken, marinara sauce and cheese.  These days 12-1pm\par snack - 2nd cup of coffee, or protein shakes\par D: eats together - 7- 8pm - sometimes basmati - broiled rice cooked - 1 cup or under salad/veg, with craven lentils or chicken.  \par nighttime - every once in a while, chips - 2 chips, piece of cake, less these days.\par No pork.\par \par snacks: throughout day - potato chips\par eating after dinner: sometimes\par overeating episodes: rare\par \par Sodas/fast food/processed foods: soda sometimes - 1/week.  \par \par Water intake per day: 64 oz per day\par coffee 1-2 cups per day.  puts milk, and tablespoon creamer. \par soda 1/week - diet pepsi. \par No alcohol. \par \par Physical activity:\par Patient enjoys: biking, walking\par Current physical activity: strength training 3/week, outdoor walk 1/week, peloton 3x/week x 30+ minutes\par \par

## 2023-05-15 NOTE — ASSESSMENT
[FreeTextEntry1] : 52 y.o F with Class 1 obesity, insulin resistance presents for weight management\par \par - continue metformin once a day. metformin again to bid\par - switching to wegovy.  \par - could also consider topiramate.  In the past was on a stimulant - did not like the stimulant effects\par - discussed shifting ratios of carbs/protein\par - incr water intake to 64 oz per day, as tolerated\par - can talk to RDN - given #  - open to more plant based meals\par \par f/u 4-6 weeks\par

## 2023-05-15 NOTE — REASON FOR VISIT
[Follow-Up] : a follow-up visit [Home] : at home, [unfilled] , at the time of the visit. [Medical Office: ([facility])___] : at the medical office located in  [Patient] : the patient

## 2023-05-30 ENCOUNTER — TRANSCRIPTION ENCOUNTER (OUTPATIENT)
Age: 53
End: 2023-05-30

## 2023-06-23 ENCOUNTER — APPOINTMENT (OUTPATIENT)
Dept: BARIATRICS/WEIGHT MGMT | Facility: CLINIC | Age: 53
End: 2023-06-23
Payer: COMMERCIAL

## 2023-06-23 VITALS — BODY MASS INDEX: 31.46 KG/M2 | WEIGHT: 172 LBS

## 2023-06-23 PROCEDURE — 99214 OFFICE O/P EST MOD 30 MIN: CPT | Mod: 95

## 2023-06-23 NOTE — ASSESSMENT
[FreeTextEntry1] : 53 y.o F with Class 1 obesity, insulin resistance presents for weight management\par \par - continue metformin once a day. metformin again to bid\par - wegovy 0.5mg dosage not available.  will try 1mg, to see if its available\par - increase PA as tolerated\par - could also consider topiramate.  In the past was on a stimulant - did not like the stimulant effects\par - discussed shifting ratios of carbs/protein\par - incr water intake to 64 oz per day, as tolerated\par - can talk to RDN - given #  - open to more plant based meals\par \par f/u 4-6 weeks\par  1.82

## 2023-06-23 NOTE — HISTORY OF PRESENT ILLNESS
[FreeTextEntry1] : Bariatric surgery history: none\par Obesity co- morbidities:HLD\par Comorbidities improved or resolved:none\par Anti-obesity medications: metformin\par Obesity medication side effects: none\par \par PATIENT WAS NOTIFIED THAT ANYTHING WE DISCUSSED IN OUR MEETING TODAY MAY BE REFLECTED IN WRITING IN THE VISIT NOTE WHICH WILL BE AVAILABLE TO OTHER MEDICAL PROVIDERS TO REVIEW AS PART OF ROUTINE PATIENT CARE.  PATIENT VERBALLY AGREED. \par \par Ms. GRAEME GRIMM is a 53 year year old female who presents for evaluation and treatment of Class 1 obesity. \par \par Obesity related co- morbidities:  HLD\par + perimenopause\par bad cough reflex\par 174.4, 5'2"\par \par amitryptylline - bid for bad cough reflex, with little codeine.  \par \par Patient lives - 3 kids, . \par Employment status - dentist - 4 days a week\par \par Weight History:\par Lowest adult weight: 120 - before marriage - in 20s.  \par Highest adult weight: 186\par \par Interim:\par - taking Ozempic for 2 months - tried to switch to wegovy but not in stock, metformin only.  Has noticed decr appetite, nausea is managed - not as obvious as what it was when she started taking it though.  \par - taking metformin once a day, and not able to get Wegovy\par - yogurt in the morning, and fruit, 5AM, and evenings 7pm - 1-2 times fried stuff, chaat, samosa, and wait 1 hour for meal\par - taking nighttime metformin, skipping in the mornings\par - doing IF 12-8pm, with friends.  having 2 meals a day, dinner heaviest with family\par - continues with the Peloton\par \par Obesity began in teens.  Weight gain has occurred with: 3 pregnancies. Lots of yoyo dieting.  3rd child at age 38 - post preg 170s, went to the gym, got , stayed in the 170s. \par \par Past weight loss attempts include: macrosInc, liquid diet, RDN.  These have produced a maximum of 10 pounds of weight loss.  MacrosInc for 1.5 years, tracking/weighing of macros. check in with  every week.\par Anti-obesity medications in the past: none.  1221 calories per day - 104g protein, 109g carbs, 41g fat. \par \par Reasons for desiring weight loss: health\par Perceived obstacles to losing weight: unsure. \par \par Sleep: 7 hours.  +snoring. previously was getting less sleep. some menopausal symptoms.  Had BERKLEY screen 7 years - mild BERKLEY, no intervention. \par \par Has 3 regular meals a day. \par \par Diet history:\par wakes up at: 6am\par B: 7am - cottage cheese/greek yogurt, zone protein bar -less these days, and cup of coffee. sometimes skips\par L: 2-3pm brings lunch at work - spaghetti squash, chicken, marinara sauce and cheese.  These days 12-1pm\par snack - 2nd cup of coffee, or protein shakes\par D: eats together - 7- 8pm - sometimes basmati - broiled rice cooked - 1 cup or under salad/veg, with craven lentils or chicken.  \par nighttime - every once in a while, chips - 2 chips, piece of cake, less these days.\par No pork.\par \par snacks: throughout day - potato chips\par eating after dinner: sometimes\par overeating episodes: rare\par \par Sodas/fast food/processed foods: soda sometimes - 1/week.  \par \par Water intake per day: 64 oz per day\par coffee 1-2 cups per day.  puts milk, and tablespoon creamer. \par soda 1/week - diet pepsi. \par No alcohol. \par \par Physical activity:\par Patient enjoys: biking, walking\par Current physical activity: strength training 3/week, outdoor walk 1/week, peloton 3x/week x 30+ minutes\par \par

## 2023-06-23 NOTE — REASON FOR VISIT
[Follow-Up] : a follow-up visit [Home] : at home, [unfilled] , at the time of the visit. [place] [place] : at the medical office located in  [Patient] : the patient

## 2023-08-04 ENCOUNTER — APPOINTMENT (OUTPATIENT)
Age: 53
End: 2023-08-04
Payer: COMMERCIAL

## 2023-08-04 ENCOUNTER — OUTPATIENT (OUTPATIENT)
Dept: OUTPATIENT SERVICES | Facility: HOSPITAL | Age: 53
LOS: 1 days | End: 2023-08-04
Payer: COMMERCIAL

## 2023-08-04 VITALS
SYSTOLIC BLOOD PRESSURE: 120 MMHG | BODY MASS INDEX: 30.73 KG/M2 | HEIGHT: 62 IN | DIASTOLIC BLOOD PRESSURE: 90 MMHG | OXYGEN SATURATION: 98 % | WEIGHT: 167 LBS | HEART RATE: 75 BPM

## 2023-08-04 DIAGNOSIS — I10 ESSENTIAL (PRIMARY) HYPERTENSION: ICD-10-CM

## 2023-08-04 DIAGNOSIS — Z00.00 ENCOUNTER FOR GENERAL ADULT MEDICAL EXAMINATION W/OUT ABNORMAL FINDINGS: ICD-10-CM

## 2023-08-04 DIAGNOSIS — E78.5 HYPERLIPIDEMIA, UNSPECIFIED: ICD-10-CM

## 2023-08-04 DIAGNOSIS — Z00.00 ENCOUNTER FOR GENERAL ADULT MEDICAL EXAMINATION WITHOUT ABNORMAL FINDINGS: ICD-10-CM

## 2023-08-04 PROCEDURE — G0463: CPT | Mod: 25

## 2023-08-04 PROCEDURE — 99396 PREV VISIT EST AGE 40-64: CPT | Mod: 25

## 2023-08-04 PROCEDURE — G0444 DEPRESSION SCREEN ANNUAL: CPT | Mod: 59

## 2023-08-04 NOTE — PHYSICAL EXAM
[No Acute Distress] : no acute distress [Well Nourished] : well nourished [Well Developed] : well developed [Well-Appearing] : well-appearing [Normal Sclera/Conjunctiva] : normal sclera/conjunctiva [PERRL] : pupils equal round and reactive to light [EOMI] : extraocular movements intact [Normal Outer Ear/Nose] : the outer ears and nose were normal in appearance [Normal Oropharynx] : the oropharynx was normal [No JVD] : no jugular venous distention [No Lymphadenopathy] : no lymphadenopathy [Supple] : supple [Thyroid Normal, No Nodules] : the thyroid was normal and there were no nodules present [No Respiratory Distress] : no respiratory distress  [No Accessory Muscle Use] : no accessory muscle use [Clear to Auscultation] : lungs were clear to auscultation bilaterally [Normal Rate] : normal rate  [Regular Rhythm] : with a regular rhythm [Normal S1, S2] : normal S1 and S2 [No Murmur] : no murmur heard [No Carotid Bruits] : no carotid bruits [No Abdominal Bruit] : a ~M bruit was not heard ~T in the abdomen [No Varicosities] : no varicosities [Pedal Pulses Present] : the pedal pulses are present [No Edema] : there was no peripheral edema [No Palpable Aorta] : no palpable aorta [No Extremity Clubbing/Cyanosis] : no extremity clubbing/cyanosis [Normal Appearance] : normal in appearance [Soft] : abdomen soft [Non Tender] : non-tender [Non-distended] : non-distended [No Masses] : no abdominal mass palpated [No HSM] : no HSM [Normal Bowel Sounds] : normal bowel sounds [No CVA Tenderness] : no CVA  tenderness [No Spinal Tenderness] : no spinal tenderness [No Joint Swelling] : no joint swelling [Grossly Normal Strength/Tone] : grossly normal strength/tone [No Rash] : no rash [Coordination Grossly Intact] : coordination grossly intact [No Focal Deficits] : no focal deficits [Normal Gait] : normal gait [Deep Tendon Reflexes (DTR)] : deep tendon reflexes were 2+ and symmetric [Normal Affect] : the affect was normal [Normal Insight/Judgement] : insight and judgment were intact

## 2023-08-06 NOTE — REVIEW OF SYSTEMS
[Recent Change In Weight] : ~T recent weight change [Hair Changes] : hair changes [Anxiety] : anxiety [Depression] : depression [Negative] : Heme/Lymph [FreeTextEntry8] : irregular meses, hot flashes, LMP Nov 2021 [de-identified] : hair loss

## 2023-08-06 NOTE — HISTORY OF PRESENT ILLNESS
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 3 children-3 c sections, 14, 17, 20 girls  is ID specialist with [employer] Moved to  at age 1, Moved to [place], [place], Father is ortho surgeon,  [employer] [facility] Concerns: Weight -dietng -lost 12 lbs in past year , but weight not budging now, wants medications to assist weight loss Weight training Lives in [place] Had 3 COVID vaccines, last 10/13/21 Knees hurting at times with prolonged standing/walking Hx dyslipidemia, and borderline LDL cholesterol Hx chronic cough after URI , possible postnasal drip, given amityrtiline and reduced cough Hx hair loss and saw derm who gave  her meds that helped Irregular menses age 47, LMP 2021

## 2023-08-11 ENCOUNTER — APPOINTMENT (OUTPATIENT)
Dept: BARIATRICS/WEIGHT MGMT | Facility: CLINIC | Age: 53
End: 2023-08-11
Payer: COMMERCIAL

## 2023-08-11 VITALS — WEIGHT: 170 LBS | BODY MASS INDEX: 31.09 KG/M2

## 2023-08-11 DIAGNOSIS — E88.81 METABOLIC SYNDROME: ICD-10-CM

## 2023-08-11 DIAGNOSIS — E78.5 HYPERLIPIDEMIA, UNSPECIFIED: ICD-10-CM

## 2023-08-11 DIAGNOSIS — E66.9 OBESITY, UNSPECIFIED: ICD-10-CM

## 2023-08-11 PROCEDURE — 99214 OFFICE O/P EST MOD 30 MIN: CPT | Mod: 95

## 2023-08-11 RX ORDER — METFORMIN HYDROCHLORIDE 500 MG/1
500 TABLET, COATED ORAL TWICE DAILY
Qty: 180 | Refills: 1 | Status: ACTIVE | COMMUNITY
Start: 2022-10-18 | End: 1900-01-01

## 2023-08-11 NOTE — ASSESSMENT
[FreeTextEntry1] : 53 y.o F with Class 1 obesity, insulin resistance presents for weight management  - continue metformin once a day. metformin again to bid - wegovy 1mg, doing well overall, noticing decr in appetite - increase PA as tolerated - could also consider topiramate.  In the past was on a stimulant - did not like the stimulant effects - discussed shifting ratios of carbs/protein - incr water intake to 64 oz per day, as tolerated - can talk to RDN - given #  - open to more plant based meals  f/u 4-6 weeks

## 2023-08-11 NOTE — REASON FOR VISIT
[Follow-Up] : a follow-up visit [Home] : at home, [unfilled] , at the time of the visit. [Medical Office: ([facility])___] : at the medical office located in  [Patient] : the patient [Self] : self

## 2023-08-11 NOTE — HISTORY OF PRESENT ILLNESS
[FreeTextEntry1] : Bariatric surgery history: none Obesity co- morbidities:HLD Comorbidities improved or resolved:none Anti-obesity medications: metformin, wegovy Obesity medication side effects: none  PATIENT WAS NOTIFIED THAT ANYTHING WE DISCUSSED IN OUR MEETING TODAY MAY BE REFLECTED IN WRITING IN THE VISIT NOTE WHICH WILL BE AVAILABLE TO OTHER MEDICAL PROVIDERS TO REVIEW AS PART OF ROUTINE PATIENT CARE.  PATIENT VERBALLY AGREED.   Ms. GRAEME GRIMM is a 53 year year old female who presents for evaluation and treatment of Class 1 obesity.   Obesity related co- morbidities:  HLD + perimenopause bad cough reflex 174.4, 5'2"  amitryptylline - bid for bad cough reflex, with little codeine.    Patient lives - 3 kids, .  Employment status - dentist - 4 days a week  Weight History: Lowest adult weight: 120 - before marriage - in 20s.   Highest adult weight: 186  Interim: - taking wegovy 1mg - taking Ozempic for 2 months - switched to wegovy, nausea is managed - not as obvious as what it was when she started taking it though.   - taking metformin once a day - yogurt in the morning, and fruit, 5AM, and evenings 7pm - 1-2 times fried stuff, chaat, samosa, and wait 1 hour for meal - taking nighttime metformin, skipping in the mornings - doing IF 12-8pm, with friends.  having 2 meals a day, dinner heaviest with family - continues with the Peloton - 4-5 times a week - no snacking these days  Obesity began in teens.  Weight gain has occurred with: 3 pregnancies. Lots of yoyo dieting.  3rd child at age 38 - post preg 170s, went to the gym, got , stayed in the 170s.   Past weight loss attempts include: macrosInc, liquid diet, RDN.  These have produced a maximum of 10 pounds of weight loss.  MacrosInc for 1.5 years, tracking/weighing of macros. check in with  every week. Anti-obesity medications in the past: none.  1221 calories per day - 104g protein, 109g carbs, 41g fat.   Reasons for desiring weight loss: health Perceived obstacles to losing weight: unsure.   Sleep: 7 hours.  +snoring. previously was getting less sleep. some menopausal symptoms.  Had BERKLEY screen 7 years - mild BERKLEY, no intervention.   Has 3 regular meals a day.   Diet history: wakes up at: 6am B: 7am - cottage cheese/greek yogurt, zone protein bar -less these days, and cup of coffee. sometimes skips L: 2-3pm brings lunch at work - spaghetti squash, chicken, marinara sauce and cheese.  These days 12-1pm snack - 2nd cup of coffee, or protein shakes D: eats together - 7- 8pm - sometimes basmati - broiled rice cooked - 1 cup or under salad/veg, with craven lentils or chicken.   nighttime - every once in a while, chips - 2 chips, piece of cake, less these days. No pork.  snacks: throughout day - potato chips eating after dinner: sometimes overeating episodes: rare  Sodas/fast food/processed foods: soda sometimes - 1/week.    Water intake per day: 64 oz per day, sometimes less.   coffee 1-2 cups per day.  puts milk, and tablespoon creamer.  soda 1/week - diet pepsi.  No alcohol.   Physical activity: Patient enjoys: biking, walking Current physical activity: strength training 3/week, outdoor walk 1/week, peloton 3x/week x 30+ minutes

## 2023-10-19 ENCOUNTER — TRANSCRIPTION ENCOUNTER (OUTPATIENT)
Age: 53
End: 2023-10-19

## 2023-10-20 ENCOUNTER — APPOINTMENT (OUTPATIENT)
Dept: BARIATRICS/WEIGHT MGMT | Facility: CLINIC | Age: 53
End: 2023-10-20

## 2023-12-28 ENCOUNTER — TRANSCRIPTION ENCOUNTER (OUTPATIENT)
Age: 53
End: 2023-12-28

## 2023-12-28 RX ORDER — SEMAGLUTIDE 1.7 MG/.75ML
1.7 INJECTION, SOLUTION SUBCUTANEOUS
Qty: 3 | Refills: 1 | Status: ACTIVE | COMMUNITY
Start: 2023-05-10 | End: 1900-01-01

## 2024-04-10 LAB
ALBUMIN SERPL ELPH-MCNC: 4.7 G/DL
ALP BLD-CCNC: 88 U/L
ALT SERPL-CCNC: 29 U/L
ANION GAP SERPL CALC-SCNC: 12 MMOL/L
AST SERPL-CCNC: 18 U/L
BILIRUB SERPL-MCNC: 0.3 MG/DL
BUN SERPL-MCNC: 13 MG/DL
CALCIUM SERPL-MCNC: 10.2 MG/DL
CHLORIDE SERPL-SCNC: 103 MMOL/L
CHOLEST SERPL-MCNC: 261 MG/DL
CO2 SERPL-SCNC: 26 MMOL/L
CREAT SERPL-MCNC: 0.84 MG/DL
EGFR: 83 ML/MIN/1.73M2
ESTIMATED AVERAGE GLUCOSE: 114 MG/DL
GLUCOSE SERPL-MCNC: 81 MG/DL
HBA1C MFR BLD HPLC: 5.6 %
HDLC SERPL-MCNC: 81 MG/DL
LDLC SERPL CALC-MCNC: 167 MG/DL
NONHDLC SERPL-MCNC: 179 MG/DL
POTASSIUM SERPL-SCNC: 4.7 MMOL/L
PROT SERPL-MCNC: 7.4 G/DL
SODIUM SERPL-SCNC: 141 MMOL/L
TRIGL SERPL-MCNC: 74 MG/DL
TSH SERPL-ACNC: 2.21 UIU/ML

## 2024-05-13 ENCOUNTER — OFFICE (OUTPATIENT)
Dept: URBAN - METROPOLITAN AREA CLINIC 32 | Facility: CLINIC | Age: 54
Setting detail: OPHTHALMOLOGY
End: 2024-05-13
Payer: COMMERCIAL

## 2024-05-13 DIAGNOSIS — H33.302: ICD-10-CM

## 2024-05-13 DIAGNOSIS — H33.312: ICD-10-CM

## 2024-05-13 DIAGNOSIS — H43.393: ICD-10-CM

## 2024-05-13 DIAGNOSIS — H52.13: ICD-10-CM

## 2024-05-13 DIAGNOSIS — H35.40: ICD-10-CM

## 2024-05-13 DIAGNOSIS — H33.321: ICD-10-CM

## 2024-05-13 PROCEDURE — 92201 OPSCPY EXTND RTA DRAW UNI/BI: CPT | Performed by: OPHTHALMOLOGY

## 2024-05-13 PROCEDURE — 92134 CPTRZ OPH DX IMG PST SGM RTA: CPT | Performed by: OPHTHALMOLOGY

## 2024-05-13 PROCEDURE — 92004 COMPRE OPH EXAM NEW PT 1/>: CPT | Mod: 25 | Performed by: OPHTHALMOLOGY

## 2024-05-13 PROCEDURE — 67145 PROPH RTA DTCHMNT PC: CPT | Mod: LT | Performed by: OPHTHALMOLOGY

## 2024-05-13 ASSESSMENT — CONFRONTATIONAL VISUAL FIELD TEST (CVF)
OD_FINDINGS: FULL
OS_FINDINGS: FULL

## 2024-05-28 ENCOUNTER — OFFICE (OUTPATIENT)
Dept: URBAN - METROPOLITAN AREA CLINIC 32 | Facility: CLINIC | Age: 54
Setting detail: OPHTHALMOLOGY
End: 2024-05-28
Payer: COMMERCIAL

## 2024-05-28 DIAGNOSIS — H33.312: ICD-10-CM

## 2024-05-28 DIAGNOSIS — H33.321: ICD-10-CM

## 2024-05-28 DIAGNOSIS — H43.393: ICD-10-CM

## 2024-05-28 PROBLEM — H52.13 MYOPIA; BOTH EYES: Status: ACTIVE | Noted: 2024-05-13

## 2024-05-28 PROBLEM — H35.40 PERIPAPILLARY ATROPHY ; RIGHT EYE: Status: ACTIVE | Noted: 2024-05-13

## 2024-05-28 PROCEDURE — 92134 CPTRZ OPH DX IMG PST SGM RTA: CPT | Performed by: OPHTHALMOLOGY

## 2024-05-28 PROCEDURE — 99213 OFFICE O/P EST LOW 20 MIN: CPT | Performed by: OPHTHALMOLOGY

## 2024-07-03 ENCOUNTER — TRANSCRIPTION ENCOUNTER (OUTPATIENT)
Age: 54
End: 2024-07-03

## 2024-07-08 ENCOUNTER — TRANSCRIPTION ENCOUNTER (OUTPATIENT)
Age: 54
End: 2024-07-08

## 2024-07-15 ENCOUNTER — DOCTOR'S OFFICE (OUTPATIENT)
Age: 54
Setting detail: OPHTHALMOLOGY
End: 2024-07-15
Payer: COMMERCIAL

## 2024-07-15 DIAGNOSIS — H33.312: ICD-10-CM

## 2024-07-15 DIAGNOSIS — H33.321: ICD-10-CM

## 2024-07-15 DIAGNOSIS — H35.40: ICD-10-CM

## 2024-07-15 DIAGNOSIS — H43.393: ICD-10-CM

## 2024-07-15 DIAGNOSIS — H52.13: ICD-10-CM

## 2024-07-15 PROCEDURE — 92012 INTRM OPH EXAM EST PATIENT: CPT | Performed by: OPHTHALMOLOGY

## 2024-07-15 ASSESSMENT — CONFRONTATIONAL VISUAL FIELD TEST (CVF)
OD_FINDINGS: FULL
OS_FINDINGS: FULL

## 2024-07-26 ENCOUNTER — APPOINTMENT (OUTPATIENT)
Dept: BARIATRICS/WEIGHT MGMT | Facility: CLINIC | Age: 54
End: 2024-07-26
Payer: COMMERCIAL

## 2024-07-26 VITALS — BODY MASS INDEX: 30.18 KG/M2 | WEIGHT: 165 LBS

## 2024-07-26 DIAGNOSIS — E88.819 INSULIN RESISTANCE, UNSPECIFIED: ICD-10-CM

## 2024-07-26 DIAGNOSIS — E66.9 OBESITY, UNSPECIFIED: ICD-10-CM

## 2024-07-26 DIAGNOSIS — E78.5 HYPERLIPIDEMIA, UNSPECIFIED: ICD-10-CM

## 2024-07-26 PROCEDURE — 99214 OFFICE O/P EST MOD 30 MIN: CPT

## 2024-07-27 PROBLEM — E88.819 INSULIN RESISTANCE: Status: ACTIVE | Noted: 2022-09-23

## 2024-07-27 NOTE — ASSESSMENT
[FreeTextEntry1] : Bariatric surgery history: none Overweight / obesity comorbidities: hld Current anti-obesity medications: wegovy Obesity medication side effects: none  reviewed lifestyle in addition to meds as necessary for success for most reviewed key nutrition principles, resources shared reviewed PA on top of nutrition and WL med as optimal encouraged to gradually make changes to PA over time incr wegovy to 2.4 mg

## 2024-07-27 NOTE — HISTORY OF PRESENT ILLNESS
[Home] : at home, [unfilled] , at the time of the visit. [Other Location: e.g. 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

## 2024-08-09 ENCOUNTER — APPOINTMENT (OUTPATIENT)
Dept: INTERNAL MEDICINE | Facility: CLINIC | Age: 54
End: 2024-08-09

## 2024-08-09 ENCOUNTER — OUTPATIENT (OUTPATIENT)
Dept: OUTPATIENT SERVICES | Facility: HOSPITAL | Age: 54
LOS: 1 days | End: 2024-08-09
Payer: COMMERCIAL

## 2024-08-09 DIAGNOSIS — I10 ESSENTIAL (PRIMARY) HYPERTENSION: ICD-10-CM

## 2024-08-09 PROBLEM — N95.2 ATROPHIC VAGINITIS: Status: ACTIVE | Noted: 2024-08-09

## 2024-08-09 PROCEDURE — 99396 PREV VISIT EST AGE 40-64: CPT

## 2024-08-09 PROCEDURE — G0463: CPT

## 2024-08-09 NOTE — PHYSICAL EXAM
[No Acute Distress] : no acute distress [Well Nourished] : well nourished [Well Developed] : well developed [Well-Appearing] : well-appearing [Normal Sclera/Conjunctiva] : normal sclera/conjunctiva [PERRL] : pupils equal round and reactive to light [EOMI] : extraocular movements intact [Normal Outer Ear/Nose] : the outer ears and nose were normal in appearance [Normal Oropharynx] : the oropharynx was normal [No JVD] : no jugular venous distention [No Lymphadenopathy] : no lymphadenopathy [Supple] : supple [Thyroid Normal, No Nodules] : the thyroid was normal and there were no nodules present [No Respiratory Distress] : no respiratory distress  [No Accessory Muscle Use] : no accessory muscle use [Clear to Auscultation] : lungs were clear to auscultation bilaterally [Normal Rate] : normal rate  [Normal S1, S2] : normal S1 and S2 [Regular Rhythm] : with a regular rhythm [No Murmur] : no murmur heard [No Carotid Bruits] : no carotid bruits [No Abdominal Bruit] : a ~M bruit was not heard ~T in the abdomen [No Varicosities] : no varicosities [Pedal Pulses Present] : the pedal pulses are present [No Edema] : there was no peripheral edema [No Palpable Aorta] : no palpable aorta [No Extremity Clubbing/Cyanosis] : no extremity clubbing/cyanosis [Normal Appearance] : normal in appearance [Soft] : abdomen soft [Non Tender] : non-tender [Non-distended] : non-distended [No Masses] : no abdominal mass palpated [No HSM] : no HSM [Normal Bowel Sounds] : normal bowel sounds [No CVA Tenderness] : no CVA  tenderness [No Spinal Tenderness] : no spinal tenderness [No Joint Swelling] : no joint swelling [Grossly Normal Strength/Tone] : grossly normal strength/tone [No Rash] : no rash [Coordination Grossly Intact] : coordination grossly intact [No Focal Deficits] : no focal deficits [Normal Gait] : normal gait [Deep Tendon Reflexes (DTR)] : deep tendon reflexes were 2+ and symmetric [Normal Affect] : the affect was normal [Normal Insight/Judgement] : insight and judgment were intact

## 2024-08-09 NOTE — PHYSICAL EXAM
[No Acute Distress] : no acute distress [Well Nourished] : well nourished [Well Developed] : well developed [Well-Appearing] : well-appearing [Normal Sclera/Conjunctiva] : normal sclera/conjunctiva [PERRL] : pupils equal round and reactive to light [EOMI] : extraocular movements intact [Normal Outer Ear/Nose] : the outer ears and nose were normal in appearance [Normal Oropharynx] : the oropharynx was normal [No JVD] : no jugular venous distention [No Lymphadenopathy] : no lymphadenopathy [Supple] : supple [Thyroid Normal, No Nodules] : the thyroid was normal and there were no nodules present [No Respiratory Distress] : no respiratory distress  [No Accessory Muscle Use] : no accessory muscle use [Clear to Auscultation] : lungs were clear to auscultation bilaterally [Normal Rate] : normal rate  [Regular Rhythm] : with a regular rhythm [Normal S1, S2] : normal S1 and S2 [No Murmur] : no murmur heard [No Carotid Bruits] : no carotid bruits [No Abdominal Bruit] : a ~M bruit was not heard ~T in the abdomen [No Varicosities] : no varicosities [Pedal Pulses Present] : the pedal pulses are present [No Edema] : there was no peripheral edema [No Palpable Aorta] : no palpable aorta [No Extremity Clubbing/Cyanosis] : no extremity clubbing/cyanosis [Normal Appearance] : normal in appearance [Soft] : abdomen soft [Non Tender] : non-tender [Non-distended] : non-distended [No Masses] : no abdominal mass palpated [No HSM] : no HSM [Normal Bowel Sounds] : normal bowel sounds [No CVA Tenderness] : no CVA  tenderness [No Spinal Tenderness] : no spinal tenderness [No Joint Swelling] : no joint swelling [Grossly Normal Strength/Tone] : grossly normal strength/tone [Coordination Grossly Intact] : coordination grossly intact [No Rash] : no rash [No Focal Deficits] : no focal deficits [Normal Gait] : normal gait [Deep Tendon Reflexes (DTR)] : deep tendon reflexes were 2+ and symmetric [Normal Affect] : the affect was normal [Normal Insight/Judgement] : insight and judgment were intact

## 2024-08-12 NOTE — HEALTH RISK ASSESSMENT
[No] : No [Never (0 pts)] : Never (0 points) [No falls in past year] : Patient reported no falls in the past year [0] : 2) Feeling down, depressed, or hopeless: Not at all (0) [PHQ-2 Negative - No further assessment needed] : PHQ-2 Negative - No further assessment needed [Never] : Never [Patient reported mammogram was normal] : Patient reported mammogram was normal [Patient reported PAP Smear was normal] : Patient reported PAP Smear was normal [Patient declined colonoscopy] : Patient declined colonoscopy [Audit-CScore] : 0 [[id number]] : 0 [MammogramDate] : 02/22 [PapSmearDate] : 01/22 [ColonoscopyComments] : declined-will do FOBT

## 2024-08-12 NOTE — HISTORY OF PRESENT ILLNESS
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

## 2024-08-12 NOTE — HEALTH RISK ASSESSMENT
[No] : No [Never (0 pts)] : Never (0 points) [No falls in past year] : Patient reported no falls in the past year [0] : 2) Feeling down, depressed, or hopeless: Not at all (0) [PHQ-2 Negative - No further assessment needed] : PHQ-2 Negative - No further assessment needed [Never] : Never [Patient reported mammogram was normal] : Patient reported mammogram was normal [Patient reported PAP Smear was normal] : Patient reported PAP Smear was normal [Patient declined colonoscopy] : Patient declined colonoscopy [Audit-CScore] : 0 [[id number]] : 0 [MammogramDate] : 02/22 [PapSmearDate] : 01/22 [ColonoscopyComments] : declined-will do FOBT

## 2024-08-12 NOTE — HISTORY OF PRESENT ILLNESS
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, still sees patients but less and more sedentary,here CPE 3 children-3 c sections, 15, 18, 21 girls  is ID specialist with [employer] Moved to  at age 1, Moved to [place], [place], Father is ortho surgeon,  [facility]  Concerns : Weight -dietng -lost 12 lbs in past year , but weight not budging now, wants medications to assist weight loss Weight training Lives in [place] Had 3 COVID vaccines, last 10/13/21 Knees hurting at times with prolonged standing/walking Hx dyslipidemia, and borderline LDL cholesterol Hx chronic cough after URI , possible postnasal drip, given amityrtiline and reduced cough Hx hair loss and saw derm who gave  her meds that helped Irregular menses age 47, LMP 2021

## 2024-08-12 NOTE — REVIEW OF SYSTEMS
[Recent Change In Weight] : ~T recent weight change [Hair Changes] : hair changes [Anxiety] : anxiety [Depression] : depression [Negative] : Heme/Lymph [FreeTextEntry8] : irregular meses, hot flashes, LMP Nov 2021 [de-identified] : hair loss

## 2024-08-12 NOTE — REVIEW OF SYSTEMS
[Recent Change In Weight] : ~T recent weight change [Hair Changes] : hair changes [Anxiety] : anxiety [Depression] : depression [Negative] : Heme/Lymph [FreeTextEntry8] : irregular meses, hot flashes, LMP Nov 2021 [de-identified] : hair loss

## 2024-08-19 DIAGNOSIS — E66.9 OBESITY, UNSPECIFIED: ICD-10-CM

## 2024-08-19 DIAGNOSIS — Z00.00 ENCOUNTER FOR GENERAL ADULT MEDICAL EXAMINATION WITHOUT ABNORMAL FINDINGS: ICD-10-CM

## 2024-08-19 DIAGNOSIS — N95.2 POSTMENOPAUSAL ATROPHIC VAGINITIS: ICD-10-CM

## 2024-08-19 DIAGNOSIS — E88.819 INSULIN RESISTANCE, UNSPECIFIED: ICD-10-CM

## 2024-08-22 ENCOUNTER — TRANSCRIPTION ENCOUNTER (OUTPATIENT)
Age: 54
End: 2024-08-22

## 2024-08-23 ENCOUNTER — APPOINTMENT (OUTPATIENT)
Dept: BARIATRICS/WEIGHT MGMT | Facility: CLINIC | Age: 54
End: 2024-08-23

## 2024-10-04 ENCOUNTER — DOCTOR'S OFFICE (OUTPATIENT)
Facility: LOCATION | Age: 54
Setting detail: OPHTHALMOLOGY
End: 2024-10-04
Payer: COMMERCIAL

## 2024-10-04 DIAGNOSIS — H52.13: ICD-10-CM

## 2024-10-04 DIAGNOSIS — H35.40: ICD-10-CM

## 2024-10-04 DIAGNOSIS — H43.393: ICD-10-CM

## 2024-10-04 DIAGNOSIS — H33.321: ICD-10-CM

## 2024-10-04 DIAGNOSIS — H33.312: ICD-10-CM

## 2024-10-04 PROCEDURE — 92012 INTRM OPH EXAM EST PATIENT: CPT | Performed by: OPHTHALMOLOGY

## 2024-10-04 PROCEDURE — 92134 CPTRZ OPH DX IMG PST SGM RTA: CPT | Performed by: OPHTHALMOLOGY

## 2024-10-04 ASSESSMENT — VISUAL ACUITY
OS_BCVA: 20/30-2
OD_BCVA: 20/25-2

## 2025-02-14 ENCOUNTER — APPOINTMENT (OUTPATIENT)
Dept: BARIATRICS/WEIGHT MGMT | Facility: CLINIC | Age: 55
End: 2025-02-14
Payer: COMMERCIAL

## 2025-02-14 DIAGNOSIS — E66.9 OBESITY, UNSPECIFIED: ICD-10-CM

## 2025-02-14 DIAGNOSIS — E78.5 HYPERLIPIDEMIA, UNSPECIFIED: ICD-10-CM

## 2025-02-14 PROCEDURE — 99214 OFFICE O/P EST MOD 30 MIN: CPT | Mod: 95

## 2025-02-14 RX ORDER — TIRZEPATIDE 2.5 MG/.5ML
2.5 INJECTION, SOLUTION SUBCUTANEOUS
Qty: 1 | Refills: 0 | Status: ACTIVE | COMMUNITY
Start: 2025-02-14 | End: 1900-01-01

## 2025-02-16 VITALS — WEIGHT: 177 LBS | BODY MASS INDEX: 32.37 KG/M2

## 2025-03-06 RX ORDER — TIRZEPATIDE 5 MG/.5ML
5 INJECTION, SOLUTION SUBCUTANEOUS
Qty: 1 | Refills: 5 | Status: ACTIVE | COMMUNITY
Start: 2025-02-27 | End: 1900-01-01

## 2025-05-01 ENCOUNTER — NON-APPOINTMENT (OUTPATIENT)
Age: 55
End: 2025-05-01

## 2025-05-09 ENCOUNTER — APPOINTMENT (OUTPATIENT)
Dept: BARIATRICS/WEIGHT MGMT | Facility: CLINIC | Age: 55
End: 2025-05-09
Payer: COMMERCIAL

## 2025-05-09 DIAGNOSIS — E88.819 INSULIN RESISTANCE, UNSPECIFIED: ICD-10-CM

## 2025-05-09 DIAGNOSIS — E66.9 OBESITY, UNSPECIFIED: ICD-10-CM

## 2025-05-09 DIAGNOSIS — E78.5 HYPERLIPIDEMIA, UNSPECIFIED: ICD-10-CM

## 2025-05-09 PROCEDURE — 99214 OFFICE O/P EST MOD 30 MIN: CPT | Mod: 95

## 2025-05-14 ENCOUNTER — TRANSCRIPTION ENCOUNTER (OUTPATIENT)
Age: 55
End: 2025-05-14

## 2025-05-16 ENCOUNTER — OUTPATIENT (OUTPATIENT)
Dept: OUTPATIENT SERVICES | Facility: HOSPITAL | Age: 55
LOS: 1 days | End: 2025-05-16
Payer: COMMERCIAL

## 2025-05-16 ENCOUNTER — APPOINTMENT (OUTPATIENT)
Dept: INTERNAL MEDICINE | Facility: CLINIC | Age: 55
End: 2025-05-16
Payer: COMMERCIAL

## 2025-05-16 VITALS
BODY MASS INDEX: 31.28 KG/M2 | DIASTOLIC BLOOD PRESSURE: 76 MMHG | WEIGHT: 171 LBS | SYSTOLIC BLOOD PRESSURE: 110 MMHG | HEART RATE: 80 BPM | OXYGEN SATURATION: 98 %

## 2025-05-16 DIAGNOSIS — Z87.898 PERSONAL HISTORY OF OTHER SPECIFIED CONDITIONS: ICD-10-CM

## 2025-05-16 DIAGNOSIS — M53.3 SACROCOCCYGEAL DISORDERS, NOT ELSEWHERE CLASSIFIED: ICD-10-CM

## 2025-05-16 DIAGNOSIS — R05.3 CHRONIC COUGH: ICD-10-CM

## 2025-05-16 DIAGNOSIS — I10 ESSENTIAL (PRIMARY) HYPERTENSION: ICD-10-CM

## 2025-05-16 PROCEDURE — G2211 COMPLEX E/M VISIT ADD ON: CPT | Mod: NC

## 2025-05-16 PROCEDURE — G0463: CPT

## 2025-05-16 PROCEDURE — 99215 OFFICE O/P EST HI 40 MIN: CPT

## 2025-05-16 RX ORDER — AMITRIPTYLINE HYDROCHLORIDE 10 MG/1
10 TABLET, FILM COATED ORAL
Qty: 30 | Refills: 0 | Status: ACTIVE | COMMUNITY
Start: 2025-05-16 | End: 2025-06-15

## 2025-05-16 RX ORDER — NALOXONE HYDROCHLORIDE NASAL 4 MG/.1ML
4 SPRAY NASAL
Qty: 2 | Refills: 3 | Status: ACTIVE | COMMUNITY
Start: 2025-05-16 | End: 1900-01-01

## 2025-05-16 RX ORDER — HYDROCODONE BITARTRATE AND HOMATROPINE METHYLBROMIDE 1.5; 5 MG/5ML; MG/5ML
5-1.5 SOLUTION ORAL
Qty: 150 | Refills: 0 | Status: ACTIVE | COMMUNITY
Start: 2025-05-16 | End: 2025-05-31

## 2025-05-19 DIAGNOSIS — Z87.898 PERSONAL HISTORY OF OTHER SPECIFIED CONDITIONS: ICD-10-CM

## 2025-05-19 DIAGNOSIS — R05.3 CHRONIC COUGH: ICD-10-CM

## 2025-05-19 DIAGNOSIS — M53.3 SACROCOCCYGEAL DISORDERS, NOT ELSEWHERE CLASSIFIED: ICD-10-CM

## 2025-05-23 ENCOUNTER — APPOINTMENT (OUTPATIENT)
Dept: RADIOLOGY | Facility: CLINIC | Age: 55
End: 2025-05-23
Payer: COMMERCIAL

## 2025-05-23 ENCOUNTER — OUTPATIENT (OUTPATIENT)
Dept: OUTPATIENT SERVICES | Facility: HOSPITAL | Age: 55
LOS: 1 days | End: 2025-05-23
Payer: COMMERCIAL

## 2025-05-23 DIAGNOSIS — M53.3 SACROCOCCYGEAL DISORDERS, NOT ELSEWHERE CLASSIFIED: ICD-10-CM

## 2025-05-23 PROCEDURE — 72220 X-RAY EXAM SACRUM TAILBONE: CPT

## 2025-05-23 PROCEDURE — 72220 X-RAY EXAM SACRUM TAILBONE: CPT | Mod: 26

## 2025-08-01 ENCOUNTER — APPOINTMENT (OUTPATIENT)
Dept: BARIATRICS/WEIGHT MGMT | Facility: CLINIC | Age: 55
End: 2025-08-01
Payer: COMMERCIAL

## 2025-08-01 VITALS — WEIGHT: 166 LBS | BODY MASS INDEX: 30.36 KG/M2

## 2025-08-01 DIAGNOSIS — E78.5 HYPERLIPIDEMIA, UNSPECIFIED: ICD-10-CM

## 2025-08-01 DIAGNOSIS — E66.9 OBESITY, UNSPECIFIED: ICD-10-CM

## 2025-08-01 PROCEDURE — 99213 OFFICE O/P EST LOW 20 MIN: CPT | Mod: 95
